# Patient Record
Sex: FEMALE | Race: WHITE
[De-identification: names, ages, dates, MRNs, and addresses within clinical notes are randomized per-mention and may not be internally consistent; named-entity substitution may affect disease eponyms.]

---

## 2018-11-07 ENCOUNTER — HOSPITAL ENCOUNTER (OUTPATIENT)
Dept: HOSPITAL 53 - M SFHCLERA | Age: 23
End: 2018-11-07
Attending: NURSE PRACTITIONER
Payer: COMMERCIAL

## 2018-11-07 DIAGNOSIS — R30.0: Primary | ICD-10-CM

## 2020-03-04 ENCOUNTER — HOSPITAL ENCOUNTER (OUTPATIENT)
Dept: HOSPITAL 53 - M SFHCLERA | Age: 25
End: 2020-03-04
Attending: PHYSICIAN ASSISTANT
Payer: COMMERCIAL

## 2020-03-04 DIAGNOSIS — R35.0: Primary | ICD-10-CM

## 2020-03-04 LAB
CHLAMYDIA DNA AMPLIFICATION: NEGATIVE
N GONORRHOEA RRNA SPEC QL NAA+PROBE: NEGATIVE

## 2020-03-04 PROCEDURE — 87661 TRICHOMONAS VAGINALIS AMPLIF: CPT

## 2020-03-04 PROCEDURE — 81025 URINE PREGNANCY TEST: CPT

## 2020-03-04 PROCEDURE — 81002 URINALYSIS NONAUTO W/O SCOPE: CPT

## 2020-03-04 PROCEDURE — 87086 URINE CULTURE/COLONY COUNT: CPT

## 2021-03-19 ENCOUNTER — HOSPITAL ENCOUNTER (OUTPATIENT)
Dept: HOSPITAL 53 - M WHC | Age: 26
End: 2021-03-19
Attending: FAMILY MEDICINE
Payer: COMMERCIAL

## 2021-03-19 DIAGNOSIS — N64.3: Primary | ICD-10-CM

## 2021-03-19 DIAGNOSIS — N63.11: ICD-10-CM

## 2021-03-19 PROCEDURE — 77066 DX MAMMO INCL CAD BI: CPT

## 2021-03-19 PROCEDURE — 76642 ULTRASOUND BREAST LIMITED: CPT

## 2021-03-19 NOTE — REP
INDICATION:

KRISTIE GLACTORRHEA, RT BREAST LUMP; KRISTIE GLACTORRHEA,RT BREAST LUMP.  Bilateral nipple

discharge for 4 months, clear on the right and thick and white on the left.  Stopped

lactating 1 year ago.  Palpable lump 11 o'clock position upper-outer quadrant right

breast x1 month.



COMPARISON:

Mammography no comparison mammography.  Comparison sonography January 13, 2016.



TECHNIQUE:

Bilateral CC and MLO) view(s) were taken.  A skin marker is affixed to the skin at the

site of the palpable lump in the upper-outer quadrant right breast.  Right breast

routine views are augmented by magnified focal spot-compression images.  3D tomography

is carried out.  Targeted right breast sonography is performed.



FINDINGS:

Scattered fibroglandular elements are seen bilaterally.  No suspicious or dominant

density is seen.  No microcalcification or architectural distortion is seen.  No

worrisome skin change is appreciated.  3-D tomosynthesis shows no additional finding.

No mammographic abnormality is noted at the site of the palpable lump in the

upper-outer quadrant the right breast or in the subareolar region on either side.



The Volpara volumetric breast density pattern is .



Targeted sonography: Bilateral retroareolar scanning demonstrates heterogeneous

fibroglandular background echotexture.  No cyst, mass or dilated duct is appreciated.

No architectural distortion is seen.  No abnormality is noted at the site of the

palpable lump in the upper-outer quadrant of the right breast.  Right axillary

scanning demonstrates a normal appearing benign lymph node 2.1 x 0.9 x 1.2 cm

diameter..

.



IMPRESSION:

BI-RADS category 2 benign findings.



This patient's Tyrer-Cuzick lifetime breast cancer risk assessment score is 16.8%.



This mammogram was interpreted with the aid of an FDA-approved computer-aided

detection system.



The patient states she had a clinical breast exam in over a year ago.



The patient letter being requested is M2.



RECOMMENDATION:

Clinical follow-up regarding the patient's palpable lump and nipple discharge.

Screening mammography beginning at age 38..





<Electronically signed by Unruly Lee > 03/19/21 [id number]